# Patient Record
Sex: FEMALE | URBAN - METROPOLITAN AREA
[De-identification: names, ages, dates, MRNs, and addresses within clinical notes are randomized per-mention and may not be internally consistent; named-entity substitution may affect disease eponyms.]

---

## 2022-02-04 ENCOUNTER — TELEPHONE (OUTPATIENT)
Dept: GENETICS | Facility: CLINIC | Age: 32
End: 2022-02-04

## 2022-02-04 NOTE — TELEPHONE ENCOUNTER
Spoke to Ms. Charles to disclose her parental follow-up testing. 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 She expected this result as she has a baby tooth that she hasn't lost. We reviewed the variable penetrance and expressivity.  Hypodontia and oligodontia are the most common symptoms reported in heterozygotes but mild to severe symptoms of ectodermal dysplasia have also been described in heterozygotes.    She was interested in getter her son, Brady, tested. He is small in size but reports no dental or ectodermal dysplasia features.     We discussed follow-up plan. Mom wanted to get her sons results back then schedule a follow-up for herself and a NP appointment for him to be worked up regarding his small stature in height and weight.     Mom also updated me regarding Acacia. She had th growth hormone test last week and they are waiting on results. She stated Acacia's bone age was noted to be delayed by 1y, GH level was fine, and stimulation hormone was low. She stated this testing was at [facility], asked mom to have those records faxed over when available.